# Patient Record
Sex: MALE | Race: WHITE | NOT HISPANIC OR LATINO | Employment: STUDENT | URBAN - METROPOLITAN AREA
[De-identification: names, ages, dates, MRNs, and addresses within clinical notes are randomized per-mention and may not be internally consistent; named-entity substitution may affect disease eponyms.]

---
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
## 2025-07-11 ENCOUNTER — OFFICE VISIT (OUTPATIENT)
Dept: FAMILY MEDICINE CLINIC | Facility: CLINIC | Age: 20
End: 2025-07-11
Payer: COMMERCIAL

## 2025-07-11 VITALS
HEART RATE: 59 BPM | WEIGHT: 254 LBS | TEMPERATURE: 98.9 F | HEIGHT: 69 IN | SYSTOLIC BLOOD PRESSURE: 118 MMHG | OXYGEN SATURATION: 99 % | DIASTOLIC BLOOD PRESSURE: 64 MMHG | RESPIRATION RATE: 16 BRPM | BODY MASS INDEX: 37.62 KG/M2

## 2025-07-11 DIAGNOSIS — S69.91XA INJURY OF RIGHT HAND, INITIAL ENCOUNTER: ICD-10-CM

## 2025-07-11 DIAGNOSIS — Z13.220 SCREENING CHOLESTEROL LEVEL: ICD-10-CM

## 2025-07-11 DIAGNOSIS — Z00.00 ANNUAL PHYSICAL EXAM: Primary | ICD-10-CM

## 2025-07-11 DIAGNOSIS — M25.539 PAIN OF WRIST AFTER TRAUMA: ICD-10-CM

## 2025-07-11 DIAGNOSIS — E66.9 OBESITY (BMI 30-39.9): ICD-10-CM

## 2025-07-11 PROBLEM — E66.3 OVERWEIGHT: Status: RESOLVED | Noted: 2017-01-03 | Resolved: 2025-07-11

## 2025-07-11 RX ORDER — METHYLPREDNISOLONE 4 MG/1
TABLET ORAL
Qty: 21 TABLET | Refills: 0 | Status: SHIPPED | OUTPATIENT
Start: 2025-07-11

## 2025-07-11 RX ORDER — IBUPROFEN 200 MG
200 TABLET ORAL EVERY 6 HOURS PRN
COMMUNITY

## 2025-07-13 ENCOUNTER — APPOINTMENT (OUTPATIENT)
Dept: CT IMAGING | Facility: HOSPITAL | Age: 20
End: 2025-07-13
Payer: OTHER MISCELLANEOUS

## 2025-07-13 ENCOUNTER — HOSPITAL ENCOUNTER (EMERGENCY)
Facility: HOSPITAL | Age: 20
Discharge: HOME | End: 2025-07-13
Attending: EMERGENCY MEDICINE | Admitting: EMERGENCY MEDICINE
Payer: OTHER MISCELLANEOUS

## 2025-07-13 VITALS
HEIGHT: 66 IN | TEMPERATURE: 98.6 F | OXYGEN SATURATION: 99 % | DIASTOLIC BLOOD PRESSURE: 75 MMHG | SYSTOLIC BLOOD PRESSURE: 125 MMHG | WEIGHT: 253 LBS | HEART RATE: 96 BPM | RESPIRATION RATE: 18 BRPM | BODY MASS INDEX: 40.66 KG/M2

## 2025-07-13 DIAGNOSIS — S01.81XA LACERATION OF FOREHEAD, INITIAL ENCOUNTER: Primary | ICD-10-CM

## 2025-07-13 PROCEDURE — 25010000002 LIDOCAINE 1% - EPINEPHRINE 1:100000 1 %-1:100000 SOLUTION: Performed by: EMERGENCY MEDICINE

## 2025-07-13 PROCEDURE — 99284 EMERGENCY DEPT VISIT MOD MDM: CPT

## 2025-07-13 PROCEDURE — 70450 CT HEAD/BRAIN W/O DYE: CPT

## 2025-07-13 RX ORDER — LIDOCAINE HYDROCHLORIDE AND EPINEPHRINE 10; 10 MG/ML; UG/ML
10 INJECTION, SOLUTION INFILTRATION; PERINEURAL ONCE
Status: COMPLETED | OUTPATIENT
Start: 2025-07-13 | End: 2025-07-13

## 2025-07-13 RX ORDER — DIAPER,BRIEF,INFANT-TODD,DISP
1 EACH MISCELLANEOUS ONCE
Status: COMPLETED | OUTPATIENT
Start: 2025-07-13 | End: 2025-07-13

## 2025-07-13 RX ADMIN — BACITRACIN 0.9 G: 500 OINTMENT TOPICAL at 20:02

## 2025-07-13 RX ADMIN — LIDOCAINE HYDROCHLORIDE,EPINEPHRINE BITARTRATE 10 ML: 10; .01 INJECTION, SOLUTION INFILTRATION; PERINEURAL at 19:21

## 2025-07-13 NOTE — ED NOTES
Patients lac cleaned with CHG sponge, irrigated with sterile saline in madhavi bottle, and spayed with anti-microbial spray    -KL

## 2025-07-13 NOTE — ED PROVIDER NOTES
Subjective   History of Present Illness  19-year-old male presents after was unloading a trailer and metal bar came back striking him in the head.  He had no loss conscious no vomiting.  No visual complaints.  No complaints of neck or back pain.  He has no chest or abdominal pain.  No extremity complaints.  Review of Systems    Past Medical History:   Diagnosis Date    Amblyopia of eye, left     blurry vision in left eye, can't see 3D. Has been through therapy    Undescended testicle 3/6/2015       Allergies   Allergen Reactions    Egg Solids, Whole Nausea Only       No past surgical history on file.    Family History   Problem Relation Age of Onset    Hyperlipidemia Mother     PKU Sister     Hyperlipidemia Maternal Grandmother     Diabetes Maternal Grandmother     Hypertension Maternal Grandfather     Alcohol abuse Paternal Grandfather        Social History     Socioeconomic History    Marital status: Single   Tobacco Use    Smoking status: Never     Passive exposure: Never    Smokeless tobacco: Never   Vaping Use    Vaping status: Former    Start date: 3/1/2022    Quit date: 3/1/2024    Substances: Nicotine   Substance and Sexual Activity    Alcohol use: Not Currently     Comment: has had alcohol before    Drug use: Never    Sexual activity: Not Currently     Partners: Female     No routine medication      Objective   Physical Exam  19-year-old male awake alert.  Examination of head reveals longitudinal laceration central forehead.  No orbital rim tenderness.  Pupils equal round react light.  No cervical thoracic lumbar spine tenderness.  No chest or abdominal tenderness lungs are clear cardiovascular rate rhythm abdomen soft nontender Neurologic exam GCS 15 no focal findings noted.  He has no extremity complaints at this time.  Laceration Repair    Date/Time: 7/13/2025 7:41 PM    Performed by: Rodrigue Nguyen MD  Authorized by: Rodrigue Nguyen MD    Consent:     Consent obtained:  Verbal    Risks discussed:   Infection  Universal protocol:     Patient identity confirmed:  Verbally with patient  Laceration details:     Location:  Face    Face location:  Forehead    Length (cm):  6  Pre-procedure details:     Preparation:  Patient was prepped and draped in usual sterile fashion and imaging obtained to evaluate for foreign bodies  Exploration:     Wound exploration: entire depth of wound visualized    Treatment:     Area cleansed with:  Chlorhexidine and saline    Amount of cleaning:  Standard    Irrigation solution:  Sterile saline    Layers/structures repaired:  Galea and deep subcutaneous  Galea:     Suture size:  4-0    Suture material:  Vicryl    Suture technique:  Simple interrupted  Deep subcutaneous:     Suture size:  4-0    Suture material:  Vicryl    Suture technique:  Simple interrupted  Skin repair:     Repair method:  Sutures    Suture size:  6-0    Suture material:  Nylon    Suture technique:  Running  Approximation:     Approximation:  Close  Post-procedure details:     Dressing:  Antibiotic ointment and sterile dressing    Procedure completion:  Tolerated well, no immediate complications  Comments:      Patient wound was explored.  Wound was noted to extend through the galea which was repaired using interrupted Vicryl suture.  Subcutaneous tissue was then closed with interrupted 5-0 Vicryl and skin was closed with a running 6-0 Ethilon suture.  Wound was dressed with bacitracin and sterile dressing.             ED Course          CT Head Without Contrast   Final Result   Impression: No acute intracranial pathology.               Electronically Signed: Mark Raymond MD     7/13/2025 6:38 PM EDT     Workstation ID: [id number]        Medications   bacitracin ointment 0.9 g (has no administration in time range)   lidocaine 1% - EPINEPHrine 1:144368 (XYLOCAINE W/EPI) 1 %-1:370768 injection 10 mL (10 mL Infiltration Given 7/13/25 1921)     /75   Pulse 96   Temp 98.6 °F (37 °C) (Oral)   Resp 18   Ht 167.6 cm  "(66\")   Wt 115 kg (253 lb)   SpO2 99%   BMI 40.84 kg/m²                                                  Medical Decision Making  Amount and/or Complexity of Data Reviewed  Radiology: ordered.    Risk  Prescription drug management.    My x-ray review and interpretation CT scan of head reveals no fracture or evidence of intracranial injury sinuses are clear.  Disposition of discussion.  Patient's tetanus immunization is current.  Wound care instructions were discussed with him and parents.  He was discharged.  Advised to follow-up with primary provider if sutures removed in 5 days.  May use Tylenol, ibuprofen for pain, return new or worsening symptoms    Final diagnoses:   Laceration of forehead, initial encounter       ED Disposition  ED Disposition       ED Disposition   Discharge    Condition   Stable    Comment   --               Pallavi Emery,   [address] [place] [address] [address]  [place] [id number] [phone]               Medication List      No changes were made to your prescriptions during this visit.            Rodrigue Nguyen MD  07/13/25 1945    "

## 2025-07-14 ENCOUNTER — TELEPHONE (OUTPATIENT)
Dept: FAMILY MEDICINE CLINIC | Facility: CLINIC | Age: 20
End: 2025-07-14
Payer: COMMERCIAL

## 2025-07-14 NOTE — TELEPHONE ENCOUNTER
Patient was seen at [facility] on 7/13 for a head wound and is needing a suture removal on 7/18 per note. Please advise as we have no availability   (0) understands/communicates without difficulty

## 2025-07-14 NOTE — TELEPHONE ENCOUNTER
Yes.  Lets double book him at 11:30 AM, all we need is vitals for him and I can go ahead and remove his sutures while the other 11:30 patient is getting roomed

## 2025-07-15 ENCOUNTER — HOSPITAL ENCOUNTER (OUTPATIENT)
Dept: GENERAL RADIOLOGY | Facility: HOSPITAL | Age: 20
Discharge: HOME | End: 2025-07-15
Payer: COMMERCIAL

## 2025-07-15 PROCEDURE — 73110 X-RAY EXAM OF WRIST: CPT

## 2025-07-15 PROCEDURE — 73130 X-RAY EXAM OF HAND: CPT

## 2025-07-17 LAB
ALBUMIN SERPL-MCNC: 4.7 G/DL (ref 4.3–5.2)
ALP SERPL-CCNC: 117 IU/L (ref 51–125)
ALT SERPL-CCNC: 31 IU/L (ref 0–44)
AST SERPL-CCNC: 20 IU/L (ref 0–40)
BASOPHILS # BLD AUTO: 0 X10E3/UL (ref 0–0.2)
BASOPHILS NFR BLD AUTO: 0 %
BILIRUB SERPL-MCNC: 0.5 MG/DL (ref 0–1.2)
BUN SERPL-MCNC: 11 MG/DL (ref 6–20)
BUN/CREAT SERPL: 11 (ref 9–20)
CALCIUM SERPL-MCNC: 9.7 MG/DL (ref 8.7–10.2)
CHLORIDE SERPL-SCNC: 101 MMOL/L (ref 96–106)
CHOLEST SERPL-MCNC: 229 MG/DL (ref 100–169)
CO2 SERPL-SCNC: 25 MMOL/L (ref 20–29)
CREAT SERPL-MCNC: 0.97 MG/DL (ref 0.76–1.27)
EGFRCR SERPLBLD CKD-EPI 2021: 115 ML/MIN/1.73
EOSINOPHIL # BLD AUTO: 0.3 X10E3/UL (ref 0–0.4)
EOSINOPHIL NFR BLD AUTO: 4 %
ERYTHROCYTE [DISTWIDTH] IN BLOOD BY AUTOMATED COUNT: 12.6 % (ref 11.6–15.4)
GLOBULIN SER CALC-MCNC: 2.8 G/DL (ref 1.5–4.5)
GLUCOSE SERPL-MCNC: 95 MG/DL (ref 70–99)
HCT VFR BLD AUTO: 46.8 % (ref 37.5–51)
HDLC SERPL-MCNC: 50 MG/DL
HGB BLD-MCNC: 15.8 G/DL (ref 13–17.7)
IMM GRANULOCYTES # BLD AUTO: 0 X10E3/UL (ref 0–0.1)
IMM GRANULOCYTES NFR BLD AUTO: 0 %
LDLC SERPL CALC-MCNC: 155 MG/DL (ref 0–109)
LYMPHOCYTES # BLD AUTO: 2.9 X10E3/UL (ref 0.7–3.1)
LYMPHOCYTES NFR BLD AUTO: 39 %
MCH RBC QN AUTO: 31.1 PG (ref 26.6–33)
MCHC RBC AUTO-ENTMCNC: 33.8 G/DL (ref 31.5–35.7)
MCV RBC AUTO: 92 FL (ref 79–97)
MONOCYTES # BLD AUTO: 0.5 X10E3/UL (ref 0.1–0.9)
MONOCYTES NFR BLD AUTO: 7 %
NEUTROPHILS # BLD AUTO: 3.7 X10E3/UL (ref 1.4–7)
NEUTROPHILS NFR BLD AUTO: 50 %
PLATELET # BLD AUTO: 256 X10E3/UL (ref 150–450)
POTASSIUM SERPL-SCNC: 4.5 MMOL/L (ref 3.5–5.2)
PROT SERPL-MCNC: 7.5 G/DL (ref 6–8.5)
RBC # BLD AUTO: 5.08 X10E6/UL (ref 4.14–5.8)
SODIUM SERPL-SCNC: 140 MMOL/L (ref 134–144)
TRIGL SERPL-MCNC: 131 MG/DL (ref 0–89)
VLDLC SERPL CALC-MCNC: 24 MG/DL (ref 5–40)
WBC # BLD AUTO: 7.4 X10E3/UL (ref 3.4–10.8)

## 2025-07-17 NOTE — PROGRESS NOTES
Subjective   Rodolfo Garcia is a 19 y.o. male. Presents to [facility] ER Follow Up  Rodolfo was seen at [facility]  ER.  He was evaluated on 0713/2025  for head laceration. He was discharged on 07/13/2025. Discharge diagnosis was Head laceration. Labs that were performed during the encounter included: see below. Diagnostic studies that were performed included: see below. Currently Rodolfo receives care at home. Complications from the hospital stay include see below. The patient stated that they do not need help with their daily life and activities. The patient stated that they do have emotional support at home.  Current outpatient and discharge medications have been reconciled for the patient.  Reviewed by: Pallavi Emery DO      Course of Events  - Patient presented to the ER with a laceration on his forehead.  Patient was unloading a trailer and the metal bar flew up and struck him in the head.  Patient denied any loss of consciousness or vomiting and denied visual complaints.  -Head CT: Normal findings  - Patient's laceration was stitched in layers with a running stitch for the top layer.  Wound dressed with bacitracin and sterile dressing    Today  - Patient and mother state the patient has been having daily headaches since he was hit in the head that have not improved (today is day 5 after incident).  Patient states that when he gets a headache sometimes his vision will occasionally get fuzzy but has no other symptoms aside from that  - Has been taking Tylenol and Motrin alternating every 2 hours but states it does not completely resolve the pain and mother is concerned that this will start to affect patient's organs  - Patient states he cannot do light duty and will need an excuse to be off work for at least a week or so  - Laceration is well-healed and patient is ready to have stitches removed      I personally reviewed and updated the patient's allergies, medications,  problem list, and past medical, surgical, social, and family history.     Allergies:  No Known Allergies      Social History:  Social History     Socioeconomic History    Marital status: Single   Tobacco Use    Smoking status: Never     Passive exposure: Never    Smokeless tobacco: Never   Vaping Use    Vaping status: Every Day    Start date: 3/1/2022    Last attempt to quit: 3/1/2024    Substances: Nicotine   Substance and Sexual Activity    Alcohol use: Not Currently     Comment: has had alcohol before    Drug use: Never    Sexual activity: Not Currently     Partners: Female       Family History:  Family History   Problem Relation Age of Onset    Hyperlipidemia Mother     PKU Sister     Hyperlipidemia Maternal Grandmother     Diabetes Maternal Grandmother     Hypertension Maternal Grandfather     Alcohol abuse Paternal Grandfather        Past Medical History :  Patient Active Problem List   Diagnosis    Constipation, chronic    Dizziness    Amblyopia of eye, left    Seasonal allergic rhinitis due to pollen    Class 2 obesity due to excess calories without serious comorbidity in adult    Obesity (BMI 30-39.9)    Mixed hyperlipidemia       Medication List:    Current Outpatient Medications:     cetirizine (zyrTEC) 10 MG tablet, Take 1 tablet by mouth Daily., Disp: 90 tablet, Rfl: 3    ibuprofen (ADVIL,MOTRIN) 200 MG tablet, Take 1 tablet by mouth Every 6 (Six) Hours As Needed for Mild Pain., Disp: , Rfl:     amitriptyline (ELAVIL) 10 MG tablet, Take 1 tablet by mouth Every Night., Disp: 30 tablet, Rfl: 1    methylPREDNISolone (MEDROL) 4 MG dose pack, Take as directed on package instructions. (Patient not taking: Reported on 7/18/2025), Disp: 21 tablet, Rfl: 0    montelukast (SINGULAIR) 10 MG tablet, Take 1 tablet by mouth Every Night. (Patient not taking: Reported on 7/18/2025), Disp: 90 tablet, Rfl: 3    Past Surgical History:  No past surgical history on file.    Review of Systems:  Review of Systems    Physical  "Exam:  Vital Signs:  Vital Signs:   /80 (BP Location: Right arm, Patient Position: Sitting, Cuff Size: Adult)   Pulse 83   Temp 98.4 °F (36.9 °C) (Temporal)   Resp 18   Ht 167.6 cm (66\")   Wt 115 kg (253 lb 3.2 oz)   SpO2 99%   BMI 40.87 kg/m²     Result Review :          Suture Removal    Date/Time: 7/18/2025 12:46 PM    Performed by: Pallavi Emery DO  Authorized by: Pallavi Emery DO  Body area: head/neck  Location details: forehead  Wound Appearance: clean  Sutures Removed: 1  Patient tolerance: patient tolerated the procedure well with no immediate complications           Physical Exam  HENT:      Head: Normocephalic and atraumatic.   Eyes:      Conjunctiva/sclera: Conjunctivae normal.   Skin:     Comments: -  laceration of forehead healing well, single running stitch down the length of the laceration   Neurological:      General: No focal deficit present.      Mental Status: He is alert and oriented to person, place, and time.   Psychiatric:         Mood and Affect: Mood normal.         Behavior: Behavior normal.          Forehead Laceration          Assessment and Plan:  Problems Addressed this Visit    None  Visit Diagnoses         Hospital discharge follow-up    -  Primary      Traumatic injury of head, subsequent encounter          H/O laceration repair          Visit for suture removal          Persistent headaches        Relevant Medications    amitriptyline (ELAVIL) 10 MG tablet      Body mass index (BMI) of 40.0 to 44.9 in adult              Diagnoses         Codes Comments      Hospital discharge follow-up    -  Primary ICD-10-CM: Z09  ICD-9-CM: V67.59       Traumatic injury of head, subsequent encounter     ICD-10-CM: S09.90XD  ICD-9-CM: V58.89, 959.01       H/O laceration repair     ICD-10-CM: Z98.890  ICD-9-CM: V45.89       Visit for suture removal     ICD-10-CM: Z48.02  ICD-9-CM: V58.32       Persistent headaches     ICD-10-CM: R51.9  ICD-9-CM: 784.0       Body mass index (BMI) of " 40.0 to 44.9 in adult     ICD-10-CM: Z68.41  ICD-9-CM: V85.41              Diagnoses and all orders for this visit:    1. Hospital discharge follow-up (Primary)/ Traumatic injury of head, subsequent encounter/ H/O laceration repair  - No damage to the skull or brain by head CT.  Patient healing well from laceration    4. Visit for suture removal  - Suture removed without incident  -     Suture Removal    5. Persistent headaches  -   Started amitriptyline (ELAVIL) 10 MG tablet; Take 1 tablet by mouth Every Night.  Dispense: 30 tablet; Refill: 1  - Gave patient a note to excuse him from work from 7/18-7/25    6. Body mass index (BMI) of 40.0 to 44.9 in adult  Pediatric BMI = >99 %ile (Z= 2.45, 134% of 95%ile) based on CDC (Boys, 2-20 Years) BMI-for-age based on BMI available on 7/18/2025.. Class 3 Severe Obesity (BMI >=40). Obesity-related health conditions include the following: Mixed Hyperlipidemia. Obesity is unchanged. BMI is is above average; BMI management plan is completed. We discussed portion control and increasing exercise.         Follow Up  - 9/2/2025 for persistent headaches

## 2025-07-18 ENCOUNTER — OFFICE VISIT (OUTPATIENT)
Dept: FAMILY MEDICINE CLINIC | Facility: CLINIC | Age: 20
End: 2025-07-18
Payer: OTHER MISCELLANEOUS

## 2025-07-18 ENCOUNTER — RESULTS FOLLOW-UP (OUTPATIENT)
Dept: FAMILY MEDICINE CLINIC | Facility: CLINIC | Age: 20
End: 2025-07-18

## 2025-07-18 VITALS
SYSTOLIC BLOOD PRESSURE: 132 MMHG | TEMPERATURE: 98.4 F | DIASTOLIC BLOOD PRESSURE: 80 MMHG | OXYGEN SATURATION: 99 % | HEIGHT: 66 IN | RESPIRATION RATE: 18 BRPM | BODY MASS INDEX: 40.69 KG/M2 | WEIGHT: 253.2 LBS | HEART RATE: 83 BPM

## 2025-07-18 DIAGNOSIS — Z98.890 H/O LACERATION REPAIR: ICD-10-CM

## 2025-07-18 DIAGNOSIS — Z09 HOSPITAL DISCHARGE FOLLOW-UP: Primary | ICD-10-CM

## 2025-07-18 DIAGNOSIS — R51.9 PERSISTENT HEADACHES: ICD-10-CM

## 2025-07-18 DIAGNOSIS — Z48.02 VISIT FOR SUTURE REMOVAL: ICD-10-CM

## 2025-07-18 DIAGNOSIS — S09.90XD TRAUMATIC INJURY OF HEAD, SUBSEQUENT ENCOUNTER: ICD-10-CM

## 2025-07-18 PROBLEM — E78.2 MIXED HYPERLIPIDEMIA: Status: ACTIVE | Noted: 2025-07-18

## 2025-07-18 RX ORDER — AMITRIPTYLINE HYDROCHLORIDE 10 MG/1
10 TABLET ORAL NIGHTLY
Qty: 30 TABLET | Refills: 1 | Status: SHIPPED | OUTPATIENT
Start: 2025-07-18

## 2025-07-18 NOTE — LETTER
July 18, 2025     Patient: Rodolfo Garcia   YOB: 2005   Date of Visit: 7/18/2025       To Whom It May Concern:     Rodolfo Garcia is a patient of mine. He has sustained a head injury and needs to be off work from 7/18/25 through 7/25/25         Sincerely,        Pallavi Emery DO

## 2025-07-18 NOTE — TELEPHONE ENCOUNTER
Spoke to pt face to face at his office appt with Dr. Emery 07/18/2025, advised pt of lab results and recommendations.